# Patient Record
Sex: FEMALE | Race: BLACK OR AFRICAN AMERICAN | Employment: UNEMPLOYED | URBAN - METROPOLITAN AREA
[De-identification: names, ages, dates, MRNs, and addresses within clinical notes are randomized per-mention and may not be internally consistent; named-entity substitution may affect disease eponyms.]

---

## 2018-07-29 ENCOUNTER — HOSPITAL ENCOUNTER (EMERGENCY)
Facility: HOSPITAL | Age: 58
Discharge: HOME | End: 2018-07-30
Attending: EMERGENCY MEDICINE

## 2018-07-29 DIAGNOSIS — R41.82 ALTERED MENTAL STATUS, UNSPECIFIED ALTERED MENTAL STATUS TYPE: Primary | ICD-10-CM

## 2018-07-29 LAB
ANION GAP SERPL CALC-SCNC: 9 MMOL/L (ref 0–18)
BASOPHILS # BLD: 0.1 K/UL (ref 0–0.2)
BASOPHILS NFR BLD: 1 %
BUN SERPL-MCNC: 11 MG/DL (ref 8–20)
BUN/CREAT SERPL: 12.9 (ref 10–20)
CALCIUM SERPL-MCNC: 9.4 MG/DL (ref 8.5–10.5)
CHLORIDE SERPL-SCNC: 103 MMOL/L (ref 95–110)
CO2 SERPL-SCNC: 26 MMOL/L (ref 22–32)
CREAT SERPL-MCNC: 0.85 MG/DL (ref 0.5–1.5)
EOSINOPHIL # BLD: 0 K/UL (ref 0–0.7)
EOSINOPHIL NFR BLD: 0 %
ERYTHROCYTE [DISTWIDTH] IN BLOOD BY AUTOMATED COUNT: 14.2 % (ref 11–15)
ETHANOL SERPL-MCNC: 3 MG/DL
GLUCOSE SERPL-MCNC: 129 MG/DL (ref 70–99)
HCT VFR BLD AUTO: 40.3 % (ref 35–48)
HGB BLD-MCNC: 13.7 G/DL (ref 12–16)
LYMPHOCYTES # BLD: 2.5 K/UL (ref 1–4)
LYMPHOCYTES NFR BLD: 42 %
MCH RBC QN AUTO: 32.2 PG (ref 27–32)
MCHC RBC AUTO-ENTMCNC: 34.1 G/DL (ref 32–37)
MCV RBC AUTO: 94.6 FL (ref 80–100)
MONOCYTES # BLD: 0.5 K/UL (ref 0–1)
MONOCYTES NFR BLD: 8 %
NEUTROPHILS # BLD AUTO: 2.9 K/UL (ref 1.8–7.7)
NEUTROPHILS NFR BLD: 49 %
OSMOLALITY UR CALC.SUM OF ELEC: 287 MOSM/KG (ref 275–295)
PLATELET # BLD AUTO: 240 K/UL (ref 140–400)
PMV BLD AUTO: 7.5 FL (ref 7.4–10.3)
POTASSIUM SERPL-SCNC: 3.4 MMOL/L (ref 3.3–5.1)
RBC # BLD AUTO: 4.27 M/UL (ref 3.7–5.4)
SODIUM SERPL-SCNC: 138 MMOL/L (ref 136–144)
WBC # BLD AUTO: 6 K/UL (ref 4–11)

## 2018-07-29 PROCEDURE — 85025 COMPLETE CBC W/AUTO DIFF WBC: CPT | Performed by: EMERGENCY MEDICINE

## 2018-07-29 PROCEDURE — 96360 HYDRATION IV INFUSION INIT: CPT

## 2018-07-29 PROCEDURE — 99284 EMERGENCY DEPT VISIT MOD MDM: CPT

## 2018-07-29 PROCEDURE — 80320 DRUG SCREEN QUANTALCOHOLS: CPT | Performed by: EMERGENCY MEDICINE

## 2018-07-29 PROCEDURE — 99285 EMERGENCY DEPT VISIT HI MDM: CPT

## 2018-07-29 PROCEDURE — 80048 BASIC METABOLIC PNL TOTAL CA: CPT | Performed by: EMERGENCY MEDICINE

## 2018-07-30 VITALS
TEMPERATURE: 99 F | HEART RATE: 91 BPM | OXYGEN SATURATION: 97 % | HEIGHT: 63 IN | WEIGHT: 130 LBS | BODY MASS INDEX: 23.04 KG/M2 | DIASTOLIC BLOOD PRESSURE: 74 MMHG | SYSTOLIC BLOOD PRESSURE: 132 MMHG | RESPIRATION RATE: 16 BRPM

## 2018-07-30 NOTE — ED PROVIDER NOTES
Patient Seen in: [facility] Emergency Department    History   Patient presents with:  Alcohol Intoxication (neurologic)    Stated Complaint: ETOH    HPI    79-year-old female with unknown past medical history was brought in by EMS for altered ment Neurological: Awake, alert. Normal reflexes. No cranial nerve deficit. Skin: Skin is warm and dry. No rash noted. No erythema.    Psychiatric:    ED Course     Labs Reviewed   BASIC METABOLIC PANEL (8) - Abnormal; Notable for the following:        Resu Impression:  Altered mental status, unspecified altered mental status type  (primary encounter diagnosis)    Disposition:  Discharge  7/29/2018 11:13 pm    Follow-up:  Zena Betancourt MD  [address] [facility]  [phone]

## 2018-07-30 NOTE — CM/SW NOTE
Case Management consulted to assist patient in discharge  Patient laying on cart with eyes closed, refusing to open eyes, or answer questions on address for discharge destination.  Unable to elicit any verbal response, on questions where patient lives and i

## 2018-07-30 NOTE — ED NOTES
called for assistance on how pt to go home , spoke with Rey, she said she will come and assess the pt.

## 2018-12-07 NOTE — ED PROVIDER NOTES
Patient Seen in: [facility] AND [facility] Emergency Department    History   Patient presents with:  Eval-P (psychiatric)    Stated Complaint: PSYCH    HPI    60year old homeless female, with unknown past medical history, presents to the emergency department by -----------         ------                     CBC W/ DIFFERENTIAL[[id number]]                              In process                   Please view results for these tests on the individual orders.    ETHYL ALCOHOL   DRUG SCREEN 7 W/OUT CONFIRMATION, DELMY

## 2018-12-07 NOTE — BH LEVEL OF CARE ASSESSMENT
Level of Care Assessment Note    General Questions  Why are you here?: Pt is refusing to answer questions   Precipitating Events: Pt is refusing to answer questions   History of Present Illness: Pt is refusing to answer questions   Collateral Information O Factors: (Pt is refusing to answer questions)  Have you harmed someone or had thoughts about wanting someone harmed or killed further back than 30 days? : (Pt is refusing to answer questions)  Current or Past Harm Toward Animals: (Pt is refusing to answer q LBS [place]: 120 LBS  IBW %: 112.5 %  IBW + 10%: 132 LBS  IBW - 10%: 108 LBS                                                                            Current/Previous MH/CD Providers  Hospitalizations, Placements, Therapy, Detox: (Pt is refusing to answer refusing to answer questions)  Verbal Abuse: (Pt is refusing to answer questions)  Sexual Abuse: (Pt is refusing to answer questions)  Neglect: (Pt is refusing to answer questions)  Does anyone say or do something to you that makes you feel unsafe?: (Pt is Unwilling to participate    Assessment Summary  Assessment Summary: Pt is a 60year old female who was found wondering. Pt reportedly told EMS she was Cherry Finch. While in ED pt refused to answer any questions.  Pt is a poor historian and very little is

## 2018-12-07 NOTE — ED NOTES
William salgado called, spoke with Adela , she gave room number pt going to Intake 101, Accepting Doctor Dr Maria Guadalupe Yoon, per [facility] transport pt at 7 AM.

## 2018-12-07 NOTE — ED NOTES
Pt uncooperative and rude towards staff. Pt refused to acknowledge RN when asked questions, only speaks when refusing vitals equipment or when demanding food/blankets.  Pt states, \"Whenever I go to the hospital they don't vitals like that so you're doing i

## 2018-12-07 NOTE — ED NOTES
Report given to Sharon Chiu RN of [facility], she states that they will call back.

## 2018-12-07 NOTE — ED NOTES
Neri Arias - does not have an appropriate bed  [facility] - gave clinical and faxed packet, awaiting response

## 2018-12-07 NOTE — ED INITIAL ASSESSMENT (HPI)
Patient found walking in the street by PD and called 911, patient homeless and uncooperative. Refusing to talk to staff. Wandering in the nurses station refusing to sit down. Patient eating chips and walking around.  Patient told PD she is yana latham

## 2020-09-29 ENCOUNTER — APPOINTMENT (OUTPATIENT)
Dept: CT IMAGING | Facility: HOSPITAL | Age: 60
End: 2020-09-29
Attending: EMERGENCY MEDICINE

## 2020-09-29 PROCEDURE — 70450 CT HEAD/BRAIN W/O DYE: CPT | Performed by: EMERGENCY MEDICINE

## 2020-09-29 NOTE — ED PROVIDER NOTES
Patient Seen in: [facility] AND [facility] Emergency Department      History   Patient presents with:  Eval-P    Stated Complaint: eval P    HPI    69-year-old female presents for psychiatric evaluation.   Patient was in a cab, acting bizarre, prompting the cabdr There is no abdominal tenderness. There is no guarding or rebound. Musculoskeletal: Normal range of motion. Skin:     General: Skin is warm and dry. Findings: No rash. Neurological:      General: No focal deficit present.       Mental Status: She evaluation and management. Disposition and Plan     Clinical Impression:  Aggressive behavior  (primary encounter diagnosis)    Disposition:  Psychiatric transfer  9/29/2020  5:42 pm    Follow-up:  No follow-up provider specified.         Medications Pre

## 2020-09-29 NOTE — ED INITIAL ASSESSMENT (HPI)
Patient presents to ER via EMS for eval P. Patient refuses to answer questions or identify who she is.

## 2020-09-29 NOTE — BH LEVEL OF CARE ASSESSMENT
Level of Care Assessment Note    General Questions  Why are you here?: Patient unable to participate in assessment due to psychosis. Patient rambling about which staff members were \"glow glow sex offenders. \" Patient initially agitated with staff then beg experience of thoughts of dying by suicide: Ben Aldana)  Protective Factors: sudeep  Past Suicidal Ideation: (sudeep)  Family History or Personal Lived Experience of Loss or Near Loss by Suicide: Ben Aldana)    Danger to Others/Property  Have you harmed someone or had thoug Last Seen: 12/18  Reason: psychosis  Effectiveness: sudeep           Current/Previous MH/CD Treatment  Recovery Support Groups: Denies Past History  History of Seclusion/Restraint: No behavior  Judgment: Poor  Fair/poor judgment as evidenced by: evidenced by symptoms and behavior  Thought Patterns  Clarity/Relevance: Illogical;Irrelevant to topic  Flow: Tangential;Disorganized; Blocking  Content: Hallucinations;Delusions  Level of Consci

## 2020-09-29 NOTE — ED NOTES
Patient taunting staff. \"Do it! Hold me down. I wont take my clothes off infront of you. You look like a glow glow glow sex offender. \"

## 2020-09-29 NOTE — ED NOTES
1:1 pt care observation care started by this er-t. Pt is currently uncooperative with staff at this time. Pt is patting/sctraching head/back, pt states \"this is what black people do\". Pt unable to give urine sample at this time.  Pt given socks/blanket an

## 2020-09-29 NOTE — ED NOTES
Security at bedside. Patient refuses to change. Patient rambling about a smell and how she hasn't had any coffee or alcohol.

## 2020-09-29 NOTE — ED NOTES
Assumed care of patient at this time. Received report from [facility]. Patient is sleeping. Awaiting MD JAMA aware, okay to wait for patient to be awake. Patient remains on pulse ox.

## 2020-09-29 NOTE — ED NOTES
Patient asking which staff members are sex offenders. Rambling about why she isn't taking her clothes off.

## 2020-09-29 NOTE — ED NOTES
Security at bedside for blood draw. Patient needed to be restrained for blood draw. MD Pagan aware that patient is refu sing to co-operate and this RN will be unable to safely transport patient to CT.  Ativan ordered

## 2020-09-30 PROBLEM — F20.0 SCHIZOPHRENIA, PARANOID, CHRONIC WITH ACUTE EXACERBATION (HCC): Status: ACTIVE | Noted: 2020-09-30

## 2020-09-30 NOTE — PROGRESS NOTES
09/29/20 1523   Vitals   Temp 97.9 °F (36.6 °C)   Pulse 112   Resp 22   /71   Height and Weight   Height 5' 3\"   Weight 135 lb   BMI (Calculated) 23.9   Oxygen Therapy   SpO2 96 %

## 2020-09-30 NOTE — ED NOTES
Attempted to do EKG on pt again pt, upon entering room pt was snoring but when waking the pt and asking to do EKG pt is still not cooperating and agitated will speak with Dr. Edgardo Fagan.

## 2020-09-30 NOTE — CERTIFICATION
Ref: [facility] 5/3-403, 5/3-602, 5/3-607, 5/3-610    5/3-702, 5/3-813, 5/4-306, 5/4-402, 5/4-403    5/4-405, 5/4-501, 5/4-611, 3/8-554   Inpatient Certificate  Re: Ebony Salazar    (name)     I personally informed the above-named individual of the purpos behavioral history, to suffer mental or emotional deterioration and is reasonably expected, after such deterioration, to meet the criteria of either paragraph one or paragraph two above;   []  An individual who is developmentally disabled and unless treate

## 2020-09-30 NOTE — ED NOTES
PCT attempted EKG and vitals. Pt refused. Pt demanded staff to remain 6 feet away. Pt is not cooperative. Pt was yelling at staff. Pt was cursing and calling staff names. Pt is refusing vitals and EKG at this time.

## 2020-09-30 NOTE — CONSULTS
[facility] HOSP - [facility]    Report of Consultation    Bar Horta Patient Status:  Emergency    1960 MRN [id number]   Location [address] Attending Ambrose Perez MD   Hosp Day # 0 PCP No primary care provider on f  to check and that is more than people working can make. Patient reported that she does not use drugs and her toxicology was negative.     The patient noticeably with response to internal stimuli and has been demonstrating disorganized speech and ir CO2 30.0 09/29/2020     (H) 09/29/2020    CA 9.5 09/29/2020    ALB 3.6 09/29/2020    ALKPHO 83 09/29/2020    TP 7.2 09/29/2020    AST 16 09/29/2020    ALT 15 09/29/2020    ETOH <3 09/29/2020         Imaging:  Ct Brain Or Head ([id number])    Result Date: function. Judgment and insight: Patient has been demonstrating poor insight denying having any oncological illness and mood adjustment by refusing medication and fighting want to go home.       Impression:   Impression:      Schizophrenia, paranoid, chroni

## 2020-09-30 NOTE — ED NOTES
Attempted to speak with the patient regarding insurance and verifying possible medicare. Patient is unable to speak with this writer and had her head covered under the blanket.  As no insurance can be verified and Medicaid is ineligible, patient will be ref

## 2020-09-30 NOTE — ED NOTES
Security in room to help staff attempt to get EKG, pt began to scream and yell \"fuck you\", \"give me coffee\", \"six feet mother fuckers\". Staff tired talking with pt and explaining the need for the EKG and what we need to do but pt would not cooperate.

## 2020-09-30 NOTE — ED NOTES
Pt became agitated after dr Rizwana Sparks at bs for eval.  Pt came out of room, wanting to leave. ermd aware, ordered ativan, security at bs.

## 2020-09-30 NOTE — ED NOTES
EKG completed @1127  Pt was uncooperative, had to be redirected/ educated as to why we needed an EKG   Rn, Jayme Brito, and  assisted.

## 2020-09-30 NOTE — ED NOTES
Report received from previous rn, patient sleeping at this time. Security at bedside with ed tech and this rn for ekg completion.  e

## 2020-09-30 NOTE — ED PROVIDER NOTES
Patient endorsed to me by Dr. Kendell Lagos for continuation of care. Patient given Ativan on my shift. Pt awaiting inpt psych transfer. Pt endorsed to Dr. Yannick Rosas for continuation of care.      EKG: rate 87, rhythm regular, axis right, no acute ST changes  Interpret

## 2020-09-30 NOTE — ED NOTES
Bandar Wylie at [facility] states he did not received the packet from the [facility]. Refaxing packet. He has not gotten the final discharge list today for bed determination.

## 2020-09-30 NOTE — ED NOTES
Per Sanam Galeano, patient was able to answer orientation questions and was unable to sign in voluntarily at this time. Patient's P/C will be faxed to [facility] for involuntary status. A copy of the rights and petition were left for the patient by Alena Dalal.

## 2020-10-01 NOTE — ED NOTES
Pt pacing in room, \"im exercising\", yelling \"fours, fours, 6 feet, stay away 6 feet\", pt tried to close curtain, explained it needed to stay open, made patient more agitated, pt medicated with ativan after d/w ermd.  Awaiting risperidone. Will cont.  [place] Hint

## 2020-10-01 NOTE — ED NOTES
Pt requesting phone at this time, RN made aware and pt given phone by RN to call Aarti [phone] in [facility] where they were staying.

## 2020-10-01 NOTE — ED NOTES
Pt up and ambulatory to bathroom. Left bathroom door open. Voided and proceeded to wash her face and mouth in the sink. Offered toothbrush. Pt shouts 'No, I want some food!'. Pt provided with sandwich and apple juice.

## 2020-10-01 NOTE — ED NOTES
Pt refusing vital signs. Security requested to be at bedside. Pt cooperated at that point. Pt went back to sleep.

## 2020-10-01 NOTE — ED NOTES
Called Glynn CONTEH Spoke to [facility] in [place].   He stated he will review the packet and call me back

## 2020-10-01 NOTE — ED NOTES
Pt refuses this ER Tech to take her vitals. Pt began shouting demanding a hot cup of coffee because her present cup is cold. Pt began pacing in and out of the restroom, running her arm under the water.  Pt was asked if she will like to have a shower we c

## 2020-10-02 NOTE — ED NOTES
Ambulated patient about 200 steps in hallway, patient pleasant at this time admiring colors in hospital, redirectable in conversation at this time, patient requesting to be able to smoke, this nurse explained there is no smoking on the campus and a nicotin

## 2020-10-02 NOTE — ED NOTES
Follow up post op as directed. Met with Catherine Mendoza. Catherine Connelltorrey remains guarded, but is more verbal today. She is alert and oriented times 3. Catherine Connelltorrey knew the date, the day of the week and the year. She knew she is at [facility] AND [facility] and she knows her name.   Catherine Mendoza knew that Jazmyn Barahona is

## 2020-10-02 NOTE — ED NOTES
Call from Al with [facility],  Noel [address] addition, Al reports that 2 sections on USARF need to be updated:  legal status and orientation. Will fax when completed.

## 2020-10-02 NOTE — ED NOTES
Patient has decreased activity, less movement around room, staring at herself in the window of door, patient talkative with staff, non-argumentative at this time, still refusing to sit in bed

## 2020-10-02 NOTE — ED NOTES
Patient states she is not going to [facility] [employer], she wants to go home, argumentative with staff, dr Pearl Mccartney aware, verbal order for haldol 5 mg im

## 2020-10-02 NOTE — ED NOTES
Pt requested staff to call [facility] at the [facility] in [place], (#[phone]) because pt is currently living there. Pt asked staff to call and make sure that belongings to the pt in the room were not taken or thrown out.  Employees at t

## 2020-10-02 NOTE — ED NOTES
Faxed requested information: new EKG, New Certificate, (even though paperwork was already filed with Ela Ramirez),  answers/ documentation related to legal issues and orientation),  to  [facility]. Also, faxed updated vital signs.

## 2020-10-02 NOTE — ED NOTES
This nurse informed patient she will be going to [facility], patient states she will not need medicine and will be cooperative with medics for transfer

## 2020-10-02 NOTE — ED NOTES
Called back [facility]. Spoke to Merline Hemming in [place]. He stated he could not accept the note documenting  orientation and cognition. Merline Hemming stated his \"doctor will review the USARF and it must be documented on the [facility] because it is a legal document\".   Ex

## 2020-10-02 NOTE — ED NOTES
Patient refusing shot, called security, helped hold patient down for medication administration, verbally abusive to staff saying we are \"going to hell\", calling for someone named Andreea Larkin, continues to respond to internal stimuli

## 2020-10-02 NOTE — ED NOTES
Attempted to give pt risperidone as scheduled. Pt ignoring the RN at this time pretending to be sleeping even though she was just up to the bathroom. Will continue to offer.

## 2020-10-02 NOTE — ED NOTES
Terry Byers from [employer] called, [phone] ext [phone], called, gave report, states patient can be sent, admitting is dr Cynthia Salvador and she will be going to [facility]

## 2020-10-02 NOTE — ED NOTES
Spoke to Shayne Roberts from [facility]. Marlene Palma is accepted for admission to [facility] under the care of DR Sydnie Rubin. [facility]. Transferred Shayne Roberts to speak to Yvonne Bridges RN for completion of nurse report.

## 2021-11-22 NOTE — ED INITIAL ASSESSMENT (HPI)
Pt to ED via EMS c/o erratic behavior at gas station P[employer]. 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 Pt presents with pressured speech and unable to answer questions. Per medics, patient is homeless.

## 2021-11-22 NOTE — BH LEVEL OF CARE ASSESSMENT
Crisis Evaluation Assessment    Gibran Schulte YOB: 1960   Age 57year old MRN [id number]   Location [address] Attending Bishop Crystal MD      Patient's legal sex: female  Patient identifies as: female  Patient Means: Access to Means  Has access to means to attempt suicide or harm others or property: No  Access to Firearm/Weapon: No  Do you have a firearm owner ID card?: No  Collateral for any access to means/firearms/weapons: no collateral obtained.   No collate Swallowing  History of aspiration or choking?: No  Feeding assistance needed?: No  Patient have any chewing or swallowing problems?: No  Special Diet: No  Mobility/Activity & Assistive Devices  Current/recent injuries or surgeries that affect mobility?: No Abuse Reportable to[de-identified] Not appropriate for reporting to authorities    Mental Status Exam:   General Appearance  Characteristics: Disheveled  Eye Contact: Indirect  Psychomotor Behavior  Gait/Movement: Other (comment) (sitting and lying on ER cart)  Abnorma aggressive or threatening. Antonio Chris may be decompensating but, she is not presenting as certifiable at the present time. Risk/Protective Factors  Protective Factors: none reported. denies having family or friends.   States she lives in a hotel    L

## 2021-11-24 NOTE — ED PROVIDER NOTES
Patient Seen in: [facility] AND [facility] Emergency Department    History   Patient presents with:  Eval-P    Stated Complaint:     HPI    Patient complains of nothing. She has schizophrenia, answering only limited questions.   Knows month, year and can name ob rashes  PSYCH:bizarre affect though non threatening      ED Course     Labs Reviewed   BASIC METABOLIC PANEL (8) - Abnormal; Notable for the following components:       Result Value    CO2 33.0 (*)     All other components within normal limits   CBC W/ DIF

## (undated) NOTE — ED AVS SNAPSHOT
Henny Donnelly   MRN: [id number]    [facility]   Date of Visit:  7/29/2018           Disclosure     Insurance plans vary and the physician(s) referred by the ER may not be covered by your plan.  Please contact yo CARE PHYSICIAN AT ONCE OR RETURN IMMEDIATELY TO THE EMERGENCY DEPARTMENT. If you have been prescribed any medication(s), please fill your prescription right away and begin taking the medication(s) as directed.   If you believe that any of the medications